# Patient Record
Sex: FEMALE
[De-identification: names, ages, dates, MRNs, and addresses within clinical notes are randomized per-mention and may not be internally consistent; named-entity substitution may affect disease eponyms.]

---

## 2018-05-17 ENCOUNTER — TRANSCRIPTION ENCOUNTER (OUTPATIENT)
Age: 28
End: 2018-05-17

## 2020-11-05 PROBLEM — Z00.00 ENCOUNTER FOR PREVENTIVE HEALTH EXAMINATION: Status: ACTIVE | Noted: 2020-11-05

## 2020-11-10 ENCOUNTER — APPOINTMENT (OUTPATIENT)
Dept: CARDIOLOGY | Facility: CLINIC | Age: 30
End: 2020-11-10
Payer: COMMERCIAL

## 2020-11-10 ENCOUNTER — NON-APPOINTMENT (OUTPATIENT)
Age: 30
End: 2020-11-10

## 2020-11-10 VITALS
OXYGEN SATURATION: 100 % | DIASTOLIC BLOOD PRESSURE: 76 MMHG | SYSTOLIC BLOOD PRESSURE: 119 MMHG | HEART RATE: 76 BPM | WEIGHT: 150 LBS | BODY MASS INDEX: 22.73 KG/M2 | HEIGHT: 68 IN

## 2020-11-10 DIAGNOSIS — Z82.49 FAMILY HISTORY OF ISCHEMIC HEART DISEASE AND OTHER DISEASES OF THE CIRCULATORY SYSTEM: ICD-10-CM

## 2020-11-10 DIAGNOSIS — Z87.891 PERSONAL HISTORY OF NICOTINE DEPENDENCE: ICD-10-CM

## 2020-11-10 DIAGNOSIS — I49.3 VENTRICULAR PREMATURE DEPOLARIZATION: ICD-10-CM

## 2020-11-10 DIAGNOSIS — R00.2 PALPITATIONS: ICD-10-CM

## 2020-11-10 DIAGNOSIS — R94.31 ABNORMAL ELECTROCARDIOGRAM [ECG] [EKG]: ICD-10-CM

## 2020-11-10 PROCEDURE — 99244 OFF/OP CNSLTJ NEW/EST MOD 40: CPT

## 2020-11-10 PROCEDURE — 99072 ADDL SUPL MATRL&STAF TM PHE: CPT

## 2020-11-10 PROCEDURE — 93000 ELECTROCARDIOGRAM COMPLETE: CPT

## 2020-11-10 RX ORDER — LEVONORGESTREL AND ETHINYL ESTRADIOL 0.1-0.02MG
0.1-2 KIT ORAL DAILY
Refills: 0 | Status: ACTIVE | COMMUNITY

## 2020-11-10 NOTE — REVIEW OF SYSTEMS
[see HPI] : see HPI [Palpitations] : palpitations [Under Stress] : under stress [Negative] : Heme/Lymph

## 2020-11-10 NOTE — PHYSICAL EXAM
[Normal Appearance] : normal appearance [Well Groomed] : well groomed [General Appearance - In No Acute Distress] : no acute distress [Eyelids - No Xanthelasma] : the eyelids demonstrated no xanthelasmas [Heart Rate And Rhythm] : heart rate and rhythm were normal [Heart Sounds] : normal S1 and S2 [Murmurs] : no murmurs present [Edema] : no peripheral edema present [] : no respiratory distress [Bowel Sounds] : normal bowel sounds [Abnormal Walk] : normal gait [Cyanosis, Localized] : no localized cyanosis [Skin Color & Pigmentation] : normal skin color and pigmentation [Oriented To Time, Place, And Person] : oriented to person, place, and time [Impaired Insight] : insight and judgment were intact [Affect] : the affect was normal [Mood] : the mood was normal [FreeTextEntry1] : No JVD

## 2020-11-10 NOTE — DISCUSSION/SUMMARY
[FreeTextEntry1] : \par Abnormal ECG: Ms Magallanes was told that the ECG performed on April 7, 2020 was abnormal because of a right bundle branch block -- I disagree; the QRS duration is normal; there is a minimal RSR' in V1 likely representing a normal variant; today's ECG is normal. No further workup is indicated at the present time.\par \par Palpitations: There is a long history of palpitations attributed to premature ventricular complexes - symptoms are infrequent; avoidance of large amounts of caffeine and smoking cessation are appropriate interventions at this time.

## 2020-11-10 NOTE — HISTORY OF PRESENT ILLNESS
[FreeTextEntry1] : Ms Magallanes is a healthy 30-year-old  with no chronic medical problems who presents for evaluation of an abnormal electrocardiogram.  She says that she participated in a mobile health screening earlier this year and was told that her ECG was abnormal because of a right bundle branch block.  She has a history of premature ventricular complexes that were diagnosed many years ago when she attempted to donate blood but was turned away because of an abnormal heart rhythm.  She describes occasional palpitations -- more frequent in years past; infrequent recently.  She has not been chest discomfort or shortness of breath.  She walks her dogs 2 times daily but otherwise does not exercise; her baseline functional status is excellent.  She consumes small amounts of caffeine.  She quit smoking cigarettes approximately 5 years ago but now vapes daily.